# Patient Record
Sex: FEMALE | Race: WHITE | NOT HISPANIC OR LATINO | Employment: UNEMPLOYED | URBAN - METROPOLITAN AREA
[De-identification: names, ages, dates, MRNs, and addresses within clinical notes are randomized per-mention and may not be internally consistent; named-entity substitution may affect disease eponyms.]

---

## 2019-05-17 PROBLEM — Z30.011 ENCOUNTER FOR ORAL CONTRACEPTION INITIAL PRESCRIPTION: Status: ACTIVE | Noted: 2018-08-17

## 2019-05-17 PROBLEM — B35.1 ONYCHOMYCOSIS OF TOENAIL: Status: ACTIVE | Noted: 2018-08-17

## 2019-05-17 PROBLEM — Z72.51 UNPROTECTED SEXUAL INTERCOURSE: Status: ACTIVE | Noted: 2018-10-01

## 2019-05-17 PROBLEM — Z12.4 CERVICAL CANCER SCREENING: Status: ACTIVE | Noted: 2018-10-01

## 2019-05-17 PROBLEM — F41.1 GAD (GENERALIZED ANXIETY DISORDER): Status: ACTIVE | Noted: 2018-08-17

## 2019-05-17 PROBLEM — R56.9 SEIZURE-LIKE ACTIVITY: Status: ACTIVE | Noted: 2019-05-17

## 2019-05-17 PROBLEM — N92.6 IRREGULAR MENSES: Status: ACTIVE | Noted: 2019-05-17

## 2019-05-17 PROBLEM — F40.01 PANIC DISORDER WITH AGORAPHOBIA AND MODERATE PANIC ATTACKS: Status: ACTIVE | Noted: 2019-05-17

## 2019-05-17 PROBLEM — T38.4X5A: Status: ACTIVE | Noted: 2019-02-19

## 2019-12-03 ENCOUNTER — SOCIAL WORK (OUTPATIENT)
Dept: ADMINISTRATIVE | Facility: OTHER | Age: 24
End: 2019-12-03

## 2019-12-03 PROBLEM — O21.9 NAUSEA AND VOMITING DURING PREGNANCY: Status: ACTIVE | Noted: 2019-12-03

## 2019-12-03 PROBLEM — Z3A.09 9 WEEKS GESTATION OF PREGNANCY: Status: ACTIVE | Noted: 2019-12-03

## 2019-12-03 NOTE — PROGRESS NOTES
NESHA faxed and scanned pt's notification of pregnancy into epic. No other needs identified at this time.    Amina Martinez,MSW  Pager#[phone]

## 2019-12-27 ENCOUNTER — SOCIAL WORK (OUTPATIENT)
Dept: ADMINISTRATIVE | Facility: OTHER | Age: 24
End: 2019-12-27

## 2019-12-27 NOTE — PROGRESS NOTES
Pt informed SW  would like to be referred to [facility]. SW completed and faxed pt's referral to the [facility]. No other needs identified at this time.    Amina MartinezMSW  Pager#[phone]

## 2020-01-14 PROBLEM — Z3A.15 15 WEEKS GESTATION OF PREGNANCY: Status: ACTIVE | Noted: 2019-12-03

## 2020-01-14 PROBLEM — O09.92 SUPERVISION OF HIGH RISK PREGNANCY IN SECOND TRIMESTER: Status: ACTIVE | Noted: 2020-01-14

## 2020-01-28 PROBLEM — Z3A.17 17 WEEKS GESTATION OF PREGNANCY: Status: ACTIVE | Noted: 2019-12-03

## 2020-01-28 PROBLEM — T38.4X5A: Status: RESOLVED | Noted: 2019-02-19 | Resolved: 2020-01-28

## 2020-01-28 PROBLEM — R51.9 PREGNANCY HEADACHE IN SECOND TRIMESTER: Status: ACTIVE | Noted: 2020-01-28

## 2020-01-28 PROBLEM — O26.892 PREGNANCY HEADACHE IN SECOND TRIMESTER: Status: ACTIVE | Noted: 2020-01-28

## 2020-01-31 PROBLEM — O28.3 ECHOGENIC INTRACARDIAC FOCUS OF FETUS ON PRENATAL ULTRASOUND: Status: ACTIVE | Noted: 2020-01-31

## 2020-02-21 PROBLEM — R51.9 PREGNANCY HEADACHE IN SECOND TRIMESTER: Status: RESOLVED | Noted: 2020-01-28 | Resolved: 2020-02-21

## 2020-02-21 PROBLEM — O26.892 PREGNANCY HEADACHE IN SECOND TRIMESTER: Status: RESOLVED | Noted: 2020-01-28 | Resolved: 2020-02-21

## 2020-02-21 PROBLEM — Z3A.21 21 WEEKS GESTATION OF PREGNANCY: Status: ACTIVE | Noted: 2019-12-03

## 2020-04-04 PROBLEM — Z12.4 CERVICAL CANCER SCREENING: Status: RESOLVED | Noted: 2018-10-01 | Resolved: 2020-04-04

## 2020-04-04 PROBLEM — R56.9 SEIZURE-LIKE ACTIVITY: Status: RESOLVED | Noted: 2019-05-17 | Resolved: 2020-04-04

## 2020-04-04 PROBLEM — O47.9 BRAXTON HICKS CONTRACTIONS: Status: ACTIVE | Noted: 2020-04-04

## 2020-04-04 PROBLEM — Z30.011 ENCOUNTER FOR ORAL CONTRACEPTION INITIAL PRESCRIPTION: Status: RESOLVED | Noted: 2018-08-17 | Resolved: 2020-04-04

## 2020-04-04 PROBLEM — N92.6 IRREGULAR MENSES: Status: RESOLVED | Noted: 2019-05-17 | Resolved: 2020-04-04

## 2020-04-04 PROBLEM — Z3A.27 27 WEEKS GESTATION OF PREGNANCY: Status: ACTIVE | Noted: 2019-12-03

## 2020-04-04 PROBLEM — O21.9 NAUSEA AND VOMITING DURING PREGNANCY: Status: RESOLVED | Noted: 2019-12-03 | Resolved: 2020-04-04

## 2020-04-04 PROBLEM — Z72.51 UNPROTECTED SEXUAL INTERCOURSE: Status: RESOLVED | Noted: 2018-10-01 | Resolved: 2020-04-04

## 2020-04-14 PROBLEM — Z3A.28 28 WEEKS GESTATION OF PREGNANCY: Status: ACTIVE | Noted: 2019-12-03

## 2020-04-14 PROBLEM — O09.93 SUPERVISION OF HIGH RISK PREGNANCY IN THIRD TRIMESTER: Status: ACTIVE | Noted: 2020-01-14

## 2020-04-16 PROBLEM — Z3A.29 29 WEEKS GESTATION OF PREGNANCY: Status: ACTIVE | Noted: 2019-12-03

## 2020-04-16 PROBLEM — O47.9 BRAXTON HICKS CONTRACTIONS: Status: RESOLVED | Noted: 2020-04-04 | Resolved: 2020-04-16

## 2020-04-16 PROBLEM — E53.8 LOW SERUM VITAMIN B12: Status: ACTIVE | Noted: 2020-04-16

## 2020-04-16 PROBLEM — R03.0 ELEVATED BP WITHOUT DIAGNOSIS OF HYPERTENSION: Status: ACTIVE | Noted: 2020-04-16

## 2020-04-28 PROBLEM — Z3A.30 30 WEEKS GESTATION OF PREGNANCY: Status: ACTIVE | Noted: 2019-12-03

## 2020-05-03 PROBLEM — Z3A.31 31 WEEKS GESTATION OF PREGNANCY: Status: ACTIVE | Noted: 2020-05-03

## 2020-05-03 PROBLEM — R55 SYNCOPE: Status: ACTIVE | Noted: 2020-05-03

## 2020-05-05 ENCOUNTER — SOCIAL WORK (OUTPATIENT)
Dept: ADMINISTRATIVE | Facility: OTHER | Age: 25
End: 2020-05-05

## 2020-05-05 NOTE — PROGRESS NOTES
NESHA received paperwork from [facility] regarding prior authorization for pt's medication Levetiracetam(Keppra)1000mg Tablet. SW completed demographic sheet of the prior authorization form and placed the form in md basket for completion. No other needs identified at this time.    CHERRY Toussaint  Pager#:[phone]

## 2020-05-11 PROBLEM — Z03.71 ENCOUNTER FOR SUSPECTED PREMATURE RUPTURE OF AMNIOTIC MEMBRANES, WITH RUPTURE OF MEMBRANES NOT FOUND: Status: ACTIVE | Noted: 2020-05-11

## 2020-05-11 PROBLEM — Z3A.32 32 WEEKS GESTATION OF PREGNANCY: Status: ACTIVE | Noted: 2020-05-03

## 2020-05-12 ENCOUNTER — SOCIAL WORK (OUTPATIENT)
Dept: ADMINISTRATIVE | Facility: OTHER | Age: 25
End: 2020-05-12

## 2020-05-12 PROBLEM — Z03.71 ENCOUNTER FOR SUSPECTED PREMATURE RUPTURE OF AMNIOTIC MEMBRANES, WITH RUPTURE OF MEMBRANES NOT FOUND: Status: RESOLVED | Noted: 2020-05-11 | Resolved: 2020-05-12

## 2020-05-12 PROBLEM — B35.1 ONYCHOMYCOSIS OF TOENAIL: Status: RESOLVED | Noted: 2018-08-17 | Resolved: 2020-05-12

## 2020-05-12 PROBLEM — R51.9 HA (HEADACHE): Status: RESOLVED | Noted: 2020-01-28 | Resolved: 2020-05-12

## 2020-05-12 NOTE — PROGRESS NOTES
SW made phone call to pt's pharmacy([facility]-[phone]) spoke with Grace-pharmacy representative informed her received paperwork from pt's insurance([employer] that stated no prior authorization is needed for medication(brett). Grace informed SW with check;per Grace medication went thorough and pt can  medication in an hour. SW made phone call to pt([phone])unable to reach left a message. No other needs identified at this time.    Amina Martinez,MSW  Pager#[phone]

## 2020-05-12 NOTE — PROGRESS NOTES
SW received pt's completed prior authorization form from [place] for medication(Keppra) and fax to([phone])[place]. No other needs identified at this time.    Amina Martinez,MSW  Pager#[phone]

## 2020-05-18 PROBLEM — O36.8130 DECREASED FETAL MOVEMENTS IN THIRD TRIMESTER: Status: ACTIVE | Noted: 2020-05-18

## 2020-05-18 PROBLEM — Z3A.33 33 WEEKS GESTATION OF PREGNANCY: Status: ACTIVE | Noted: 2020-05-03

## 2020-05-18 PROBLEM — R10.2 PELVIC PRESSURE IN FEMALE: Status: ACTIVE | Noted: 2020-05-18

## 2020-05-19 PROBLEM — O36.8130 DECREASED FETAL MOVEMENTS IN THIRD TRIMESTER: Status: RESOLVED | Noted: 2020-05-18 | Resolved: 2020-05-19

## 2020-05-19 PROBLEM — R10.2 PELVIC PRESSURE IN FEMALE: Status: RESOLVED | Noted: 2020-05-18 | Resolved: 2020-05-19

## 2020-05-26 PROBLEM — Z3A.34 34 WEEKS GESTATION OF PREGNANCY: Status: ACTIVE | Noted: 2020-05-26

## 2020-05-26 PROBLEM — Z3A.33 33 WEEKS GESTATION OF PREGNANCY: Status: RESOLVED | Noted: 2020-05-03 | Resolved: 2020-05-26

## 2020-05-26 PROBLEM — R12 HEARTBURN DURING PREGNANCY IN THIRD TRIMESTER: Status: ACTIVE | Noted: 2020-05-26

## 2020-05-26 PROBLEM — O26.893 HEARTBURN DURING PREGNANCY IN THIRD TRIMESTER: Status: ACTIVE | Noted: 2020-05-26

## 2020-05-28 PROBLEM — O26.899 ABDOMINAL PAIN AFFECTING PREGNANCY: Status: ACTIVE | Noted: 2020-05-28

## 2020-05-28 PROBLEM — Z3A.35 35 WEEKS GESTATION OF PREGNANCY: Status: ACTIVE | Noted: 2020-05-26

## 2020-05-28 PROBLEM — R10.9 ABDOMINAL PAIN AFFECTING PREGNANCY: Status: ACTIVE | Noted: 2020-05-28

## 2020-06-07 PROBLEM — Z3A.36 36 WEEKS GESTATION OF PREGNANCY: Status: ACTIVE | Noted: 2020-05-26

## 2020-06-07 PROBLEM — O47.9 UTERINE CONTRACTIONS DURING PREGNANCY: Status: ACTIVE | Noted: 2020-06-07

## 2020-06-07 PROBLEM — B37.31 VAGINAL CANDIDIASIS: Status: ACTIVE | Noted: 2020-06-07

## 2020-06-16 PROBLEM — Z3A.36 36 WEEKS GESTATION OF PREGNANCY: Status: RESOLVED | Noted: 2020-05-26 | Resolved: 2020-06-16

## 2020-06-23 PROBLEM — O14.93 PRE-ECLAMPSIA IN THIRD TRIMESTER: Status: ACTIVE | Noted: 2020-06-23

## 2020-06-23 PROBLEM — O13.9 GESTATIONAL HYPERTENSION: Status: ACTIVE | Noted: 2020-06-23

## 2020-08-06 ENCOUNTER — SOCIAL WORK (OUTPATIENT)
Dept: ADMINISTRATIVE | Facility: OTHER | Age: 25
End: 2020-08-06

## 2020-08-06 NOTE — PROGRESS NOTES
NESHA received paperwork from [facility] regarding  prior authorization for pt's medication(Levetiracetam). NESHA made phone call to pt's pharmacy([phone]) spoke with Moon-girish dukes regarding prior authorization for pt medication(Levetiracetam);per Moon it does require a prior authorization. NESHA made phone call to pt's insurance pharmacy([facility]-[phone]) spoke with Ca-prior authorization representative and completed a prior authorization for pt's medication(Levetiracetam). Ca informed SW that pt did a refill on the medication  7/15/20 and can not do another one until 8/10/20  SW made phone call to pt's pharmacy informed Moon regarding the above. No other needs identified at this time.    Amina Martinez,MSW  Pager#:[phone]

## 2020-09-07 PROBLEM — Z03.71 ENCOUNTER FOR SUSPECTED PREMATURE RUPTURE OF MEMBRANES, WITH RUPTURE OF MEMBRANES NOT FOUND: Status: RESOLVED | Noted: 2020-05-11 | Resolved: 2020-09-07

## 2020-09-28 PROBLEM — O14.93 PRE-ECLAMPSIA IN THIRD TRIMESTER: Status: RESOLVED | Noted: 2020-06-23 | Resolved: 2020-09-28

## 2021-04-13 PROBLEM — O26.893 HEARTBURN DURING PREGNANCY IN THIRD TRIMESTER: Status: RESOLVED | Noted: 2020-05-26 | Resolved: 2021-04-13

## 2021-04-13 PROBLEM — O26.899 ABDOMINAL PAIN AFFECTING PREGNANCY: Status: RESOLVED | Noted: 2020-05-28 | Resolved: 2021-04-13

## 2021-04-13 PROBLEM — O09.93 SUPERVISION OF HIGH RISK PREGNANCY IN THIRD TRIMESTER: Status: RESOLVED | Noted: 2020-01-14 | Resolved: 2021-04-13

## 2021-04-13 PROBLEM — R12 HEARTBURN DURING PREGNANCY IN THIRD TRIMESTER: Status: RESOLVED | Noted: 2020-05-26 | Resolved: 2021-04-13

## 2021-04-13 PROBLEM — O13.9 GESTATIONAL HYPERTENSION: Status: RESOLVED | Noted: 2020-06-23 | Resolved: 2021-04-13

## 2021-04-13 PROBLEM — F40.01 PANIC DISORDER WITH AGORAPHOBIA AND MODERATE PANIC ATTACKS: Chronic | Status: ACTIVE | Noted: 2019-05-17

## 2021-04-13 PROBLEM — B37.31 VAGINAL CANDIDIASIS: Status: RESOLVED | Noted: 2020-06-07 | Resolved: 2021-04-13

## 2021-04-13 PROBLEM — F41.1 GAD (GENERALIZED ANXIETY DISORDER): Chronic | Status: ACTIVE | Noted: 2018-08-17

## 2021-04-13 PROBLEM — R10.9 ABDOMINAL PAIN AFFECTING PREGNANCY: Status: RESOLVED | Noted: 2020-05-28 | Resolved: 2021-04-13

## 2021-04-13 PROBLEM — R55 SYNCOPE: Status: RESOLVED | Noted: 2020-05-03 | Resolved: 2021-04-13

## 2021-04-13 PROBLEM — O28.3 ECHOGENIC INTRACARDIAC FOCUS OF FETUS ON PRENATAL ULTRASOUND: Status: RESOLVED | Noted: 2020-01-31 | Resolved: 2021-04-13

## 2021-04-13 PROBLEM — O47.9 UTERINE CONTRACTIONS DURING PREGNANCY: Status: RESOLVED | Noted: 2020-06-07 | Resolved: 2021-04-13

## 2022-03-23 ENCOUNTER — SOCIAL WORK (OUTPATIENT)
Dept: ADMINISTRATIVE | Facility: OTHER | Age: 27
End: 2022-03-23

## 2022-03-23 NOTE — PROGRESS NOTES
SW met with pt regarding initial OB assessment. Pt stated this is her 3rd pregnancy/1-ectopic. Pt stated lives with her /child-1 1/2 and able to perform ADL's independently. Pt stated does not work. Pt stated support system is her /Christian. Pt stated has medicaid([facility] Pt stated does not have WIC. Pt stated is going to breastfeed. SW provide pt with information on other community resources.NESHA faxed and scanned pt's notification of pregnancy into epic.  No other needs identified at this time.    Amina Martinez,MSW  Pager#[phone]

## 2022-04-18 PROBLEM — O02.1 MISSED ABORTION: Status: ACTIVE | Noted: 2022-04-18

## 2022-04-20 PROBLEM — Z98.890 STATUS POST D&C: Status: ACTIVE | Noted: 2022-04-20

## 2022-04-20 PROBLEM — O02.1 MISSED ABORTION: Status: RESOLVED | Noted: 2022-04-18 | Resolved: 2022-04-20

## 2022-06-22 PROBLEM — Z30.011 ENCOUNTER FOR INITIAL PRESCRIPTION OF CONTRACEPTIVE PILLS: Status: ACTIVE | Noted: 2022-06-22

## 2022-08-30 ENCOUNTER — SOCIAL WORK (OUTPATIENT)
Dept: ADMINISTRATIVE | Facility: OTHER | Age: 27
End: 2022-08-30

## 2022-08-30 NOTE — PROGRESS NOTES
SW met with pt regarding initial OB assessment. Pt stated this is her 3rd pregnancy/1-miscarriage. Pt stated lives with her /child-2 and able to perform ADL's independently. Pt stated does not work. Pt stated support system is her /Christian. Pt stated has medicaid([employer]). Pt stated does not have WIC. Pt stated is going to breastfeed. SW provide pt with information on other community resources.SW faxed and scanned pt's notification of pregnancy into epic.  No other needs identified at this time.    Amina Martinez,MSW  Pager#[phone]

## 2022-10-28 PROBLEM — O99.340 ANXIETY DURING PREGNANCY: Status: ACTIVE | Noted: 2018-08-17

## 2022-10-28 PROBLEM — Z30.011 ENCOUNTER FOR INITIAL PRESCRIPTION OF CONTRACEPTIVE PILLS: Status: RESOLVED | Noted: 2022-06-22 | Resolved: 2022-10-28

## 2022-10-28 PROBLEM — Z98.890 STATUS POST D&C: Status: RESOLVED | Noted: 2022-04-20 | Resolved: 2022-10-28

## 2022-10-28 PROBLEM — R55 VASOVAGAL SYNCOPE: Status: RESOLVED | Noted: 2020-05-03 | Resolved: 2022-10-28

## 2022-10-28 PROBLEM — R00.2 PALPITATIONS: Status: ACTIVE | Noted: 2022-10-28

## 2022-10-28 PROBLEM — O09.299 HISTORY OF PRE-ECLAMPSIA IN PRIOR PREGNANCY, CURRENTLY PREGNANT: Status: ACTIVE | Noted: 2022-10-28

## 2022-10-28 PROBLEM — F41.9 ANXIETY DURING PREGNANCY: Status: ACTIVE | Noted: 2018-08-17

## 2022-10-28 PROBLEM — O09.92 HIGH-RISK PREGNANCY IN SECOND TRIMESTER: Status: ACTIVE | Noted: 2022-10-28

## 2022-10-28 PROBLEM — O02.1 MISSED ABORTION: Status: RESOLVED | Noted: 2022-04-18 | Resolved: 2022-10-28

## 2022-10-28 PROBLEM — R03.0 ELEVATED BP WITHOUT DIAGNOSIS OF HYPERTENSION: Status: RESOLVED | Noted: 2020-04-16 | Resolved: 2022-10-28

## 2022-10-31 PROBLEM — R51.9 PREGNANCY HEADACHE IN SECOND TRIMESTER: Status: ACTIVE | Noted: 2022-10-31

## 2022-10-31 PROBLEM — O16.9 ELEVATED BLOOD PRESSURE AFFECTING PREGNANCY, ANTEPARTUM: Status: ACTIVE | Noted: 2022-10-31

## 2022-10-31 PROBLEM — O26.892 PREGNANCY HEADACHE IN SECOND TRIMESTER: Status: ACTIVE | Noted: 2022-10-31

## 2022-11-09 PROBLEM — O23.42 URINARY TRACT INFECTION IN MOTHER DURING SECOND TRIMESTER OF PREGNANCY: Status: ACTIVE | Noted: 2022-11-09

## 2022-11-28 PROBLEM — E53.8 LOW SERUM VITAMIN B12: Status: RESOLVED | Noted: 2020-04-16 | Resolved: 2022-11-28

## 2022-12-12 PROBLEM — O35.9XX0 TERATOGEN EXPOSURE IN CURRENT PREGNANCY: Status: ACTIVE | Noted: 2022-12-12

## 2023-01-10 ENCOUNTER — PATIENT MESSAGE (OUTPATIENT)
Dept: ADMINISTRATIVE | Facility: OTHER | Age: 28
End: 2023-01-10

## 2023-01-31 ENCOUNTER — PATIENT MESSAGE (OUTPATIENT)
Dept: ADMINISTRATIVE | Facility: OTHER | Age: 28
End: 2023-01-31

## 2023-02-13 PROBLEM — O23.42 URINARY TRACT INFECTION IN MOTHER DURING SECOND TRIMESTER OF PREGNANCY: Status: RESOLVED | Noted: 2022-11-09 | Resolved: 2023-02-13

## 2023-03-02 PROBLEM — O10.919 CHRONIC HYPERTENSION AFFECTING PREGNANCY: Status: ACTIVE | Noted: 2023-03-02

## 2023-03-03 PROBLEM — O11.9 CHRONIC HYPERTENSION WITH SUPERIMPOSED PREECLAMPSIA: Status: ACTIVE | Noted: 2023-03-03

## 2023-03-04 PROBLEM — O47.9 UTERINE CONTRACTIONS: Status: RESOLVED | Noted: 2020-06-07 | Resolved: 2023-03-04

## 2023-03-04 PROBLEM — O16.9 ELEVATED BLOOD PRESSURE AFFECTING PREGNANCY, ANTEPARTUM: Status: RESOLVED | Noted: 2022-10-31 | Resolved: 2023-03-04

## 2023-03-04 PROBLEM — K21.9 GERD (GASTROESOPHAGEAL REFLUX DISEASE): Status: ACTIVE | Noted: 2023-03-04

## 2023-03-04 PROBLEM — Z30.09 UNWANTED FERTILITY: Status: ACTIVE | Noted: 2023-03-04

## 2023-03-05 PROBLEM — O14.13 SEVERE PRE-ECLAMPSIA IN THIRD TRIMESTER: Status: ACTIVE | Noted: 2023-03-05

## 2023-03-06 PROBLEM — O09.92 HIGH-RISK PREGNANCY IN SECOND TRIMESTER: Status: RESOLVED | Noted: 2022-10-28 | Resolved: 2023-03-06

## 2023-03-17 ENCOUNTER — PATIENT MESSAGE (OUTPATIENT)
Dept: RESEARCH | Facility: HOSPITAL | Age: 28
End: 2023-03-17

## 2023-03-31 PROBLEM — R51.9 HEADACHE IN PREGNANCY, POSTPARTUM: Status: ACTIVE | Noted: 2023-03-31

## 2023-03-31 PROBLEM — R60.0 BILATERAL LOWER EXTREMITY EDEMA: Status: ACTIVE | Noted: 2023-03-31

## 2023-04-20 PROBLEM — O99.019 ANEMIA AFFECTING PREGNANCY: Status: ACTIVE | Noted: 2023-02-17

## 2023-05-05 PROBLEM — Z98.51 S/P TUBAL LIGATION: Status: ACTIVE | Noted: 2023-05-05

## 2023-11-06 PROBLEM — G89.29 CHRONIC RIGHT-SIDED THORACIC BACK PAIN: Status: ACTIVE | Noted: 2023-11-06

## 2023-11-06 PROBLEM — M54.6 CHRONIC RIGHT-SIDED THORACIC BACK PAIN: Status: ACTIVE | Noted: 2023-11-06

## 2023-12-04 PROBLEM — Z98.51 S/P TUBAL LIGATION: Status: RESOLVED | Noted: 2023-05-05 | Resolved: 2023-12-04

## 2023-12-04 PROBLEM — O14.13 SEVERE PRE-ECLAMPSIA IN THIRD TRIMESTER: Status: RESOLVED | Noted: 2023-03-05 | Resolved: 2023-12-04

## 2023-12-04 PROBLEM — Z30.09 UNWANTED FERTILITY: Status: RESOLVED | Noted: 2023-03-04 | Resolved: 2023-12-04

## 2023-12-04 PROBLEM — O35.9XX0 TERATOGEN EXPOSURE IN CURRENT PREGNANCY: Status: RESOLVED | Noted: 2022-12-12 | Resolved: 2023-12-04

## 2023-12-04 PROBLEM — O09.299 HISTORY OF PRE-ECLAMPSIA IN PRIOR PREGNANCY, CURRENTLY PREGNANT: Status: RESOLVED | Noted: 2022-10-28 | Resolved: 2023-12-04

## 2023-12-04 PROBLEM — O11.9 CHRONIC HYPERTENSION WITH SUPERIMPOSED PREECLAMPSIA: Status: RESOLVED | Noted: 2023-03-03 | Resolved: 2023-12-04
